# Patient Record
Sex: MALE | Race: BLACK OR AFRICAN AMERICAN | NOT HISPANIC OR LATINO | Employment: FULL TIME | URBAN - METROPOLITAN AREA
[De-identification: names, ages, dates, MRNs, and addresses within clinical notes are randomized per-mention and may not be internally consistent; named-entity substitution may affect disease eponyms.]

---

## 2021-01-27 ENCOUNTER — OFFICE VISIT (OUTPATIENT)
Dept: URGENT CARE | Facility: CLINIC | Age: 21
End: 2021-01-27
Payer: MEDICAID

## 2021-01-27 VITALS
OXYGEN SATURATION: 100 % | SYSTOLIC BLOOD PRESSURE: 129 MMHG | WEIGHT: 195 LBS | BODY MASS INDEX: 31.34 KG/M2 | HEIGHT: 66 IN | TEMPERATURE: 98 F | DIASTOLIC BLOOD PRESSURE: 67 MMHG | HEART RATE: 89 BPM

## 2021-01-27 DIAGNOSIS — K52.9 GASTROENTERITIS: Primary | ICD-10-CM

## 2021-01-27 PROCEDURE — 99203 PR OFFICE/OUTPT VISIT, NEW, LEVL III, 30-44 MIN: ICD-10-PCS | Mod: S$GLB,,, | Performed by: FAMILY MEDICINE

## 2021-01-27 PROCEDURE — 99203 OFFICE O/P NEW LOW 30 MIN: CPT | Mod: S$GLB,,, | Performed by: FAMILY MEDICINE

## 2021-01-27 RX ORDER — DIPHENOXYLATE HYDROCHLORIDE AND ATROPINE SULFATE 2.5; .025 MG/1; MG/1
1 TABLET ORAL 4 TIMES DAILY PRN
Qty: 20 TABLET | Refills: 0 | Status: SHIPPED | OUTPATIENT
Start: 2021-01-27

## 2021-01-27 RX ORDER — PROMETHAZINE HYDROCHLORIDE 25 MG/1
25 SUPPOSITORY RECTAL EVERY 6 HOURS PRN
Qty: 5 SUPPOSITORY | Refills: 0 | Status: SHIPPED | OUTPATIENT
Start: 2021-01-27

## 2021-01-27 RX ORDER — PROMETHAZINE HYDROCHLORIDE 25 MG/1
25 TABLET ORAL EVERY 6 HOURS PRN
Qty: 20 TABLET | Refills: 0 | Status: SHIPPED | OUTPATIENT
Start: 2021-01-27

## 2021-05-04 ENCOUNTER — PATIENT MESSAGE (OUTPATIENT)
Dept: RESEARCH | Facility: HOSPITAL | Age: 21
End: 2021-05-04

## 2023-10-06 ENCOUNTER — HOSPITAL ENCOUNTER (EMERGENCY)
Facility: HOSPITAL | Age: 23
Discharge: HOME | End: 2023-10-06
Attending: EMERGENCY MEDICINE
Payer: MEDICAID

## 2023-10-06 VITALS
RESPIRATION RATE: 20 BRPM | SYSTOLIC BLOOD PRESSURE: 125 MMHG | HEIGHT: 68 IN | WEIGHT: 196 LBS | OXYGEN SATURATION: 100 % | BODY MASS INDEX: 29.7 KG/M2 | TEMPERATURE: 98 F | HEART RATE: 84 BPM | DIASTOLIC BLOOD PRESSURE: 62 MMHG

## 2023-10-06 DIAGNOSIS — Z20.2 EXPOSURE TO CHLAMYDIA: ICD-10-CM

## 2023-10-06 DIAGNOSIS — Z20.2 EXPOSURE TO STD: Primary | ICD-10-CM

## 2023-10-06 LAB
AMPHET+METHAMPHET UR QL: NEGATIVE
BARBITURATES UR QL SCN>200 NG/ML: NEGATIVE
BENZODIAZ UR QL SCN>200 NG/ML: NEGATIVE
BILIRUB UR QL STRIP: NEGATIVE
BZE UR QL SCN: NEGATIVE
CANNABINOIDS UR QL SCN: NEGATIVE
CLARITY UR: CLEAR
COLOR UR: YELLOW
CREAT UR-MCNC: 138.2 MG/DL (ref 23–375)
GLUCOSE UR QL STRIP: NEGATIVE
HGB UR QL STRIP: NEGATIVE
KETONES UR QL STRIP: NEGATIVE
LEUKOCYTE ESTERASE UR QL STRIP: NEGATIVE
METHADONE UR QL SCN>300 NG/ML: NEGATIVE
NITRITE UR QL STRIP: NEGATIVE
OPIATES UR QL SCN: NEGATIVE
PCP UR QL SCN>25 NG/ML: NEGATIVE
PH UR STRIP: 7 [PH] (ref 5–8)
PROT UR QL STRIP: NEGATIVE
SP GR UR STRIP: 1.02 (ref 1–1.03)
TOXICOLOGY INFORMATION: NORMAL
URN SPEC COLLECT METH UR: NORMAL
UROBILINOGEN UR STRIP-ACNC: NEGATIVE EU/DL

## 2023-10-06 PROCEDURE — 87591 N.GONORRHOEAE DNA AMP PROB: CPT | Performed by: NURSE PRACTITIONER

## 2023-10-06 PROCEDURE — 80307 DRUG TEST PRSMV CHEM ANLYZR: CPT | Performed by: NURSE PRACTITIONER

## 2023-10-06 PROCEDURE — 96372 THER/PROPH/DIAG INJ SC/IM: CPT | Performed by: NURSE PRACTITIONER

## 2023-10-06 PROCEDURE — 81003 URINALYSIS AUTO W/O SCOPE: CPT | Mod: 59 | Performed by: NURSE PRACTITIONER

## 2023-10-06 PROCEDURE — 99284 EMERGENCY DEPT VISIT MOD MDM: CPT

## 2023-10-06 PROCEDURE — 63600175 PHARM REV CODE 636 W HCPCS: Performed by: NURSE PRACTITIONER

## 2023-10-06 RX ORDER — CEFTRIAXONE 500 MG/1
500 INJECTION, POWDER, FOR SOLUTION INTRAMUSCULAR; INTRAVENOUS
Status: COMPLETED | OUTPATIENT
Start: 2023-10-06 | End: 2023-10-06

## 2023-10-06 RX ORDER — DOXYCYCLINE 100 MG/1
100 CAPSULE ORAL EVERY 12 HOURS
Qty: 14 CAPSULE | Refills: 0 | Status: SHIPPED | OUTPATIENT
Start: 2023-10-06 | End: 2023-10-13

## 2023-10-06 RX ADMIN — CEFTRIAXONE SODIUM 500 MG: 500 INJECTION, POWDER, FOR SOLUTION INTRAMUSCULAR; INTRAVENOUS at 05:10

## 2023-10-06 NOTE — FIRST PROVIDER EVALUATION
"Medical screening examination initiated.  I have conducted a focused provider triage encounter, findings are as follows:    Brief history of present illness:  pt came for shot for chlamydia    Vitals:    10/06/23 1643   BP: (!) 119/52   BP Location: Right arm   Patient Position: Sitting   Pulse: 81   Resp: 18   Temp: 98.1 °F (36.7 °C)   TempSrc: Oral   SpO2: 99%   Weight: 88.9 kg (196 lb)   Height: 5' 8" (1.727 m)       Pertinent physical exam:  NAD    Brief workup plan:  labs    Preliminary workup initiated; this workup will be continued and followed by the physician or advanced practice provider that is assigned to the patient when roomed.  "

## 2023-10-06 NOTE — ED TRIAGE NOTES
Nakul Bueno, a 23 y.o. male presents to the ED w/ complaint of chlamydia. Pt requesting IM shot. Pt denies any symptoms, pain, or discharge. Pt is AAOX4, VSS, and NADN.

## 2023-10-06 NOTE — ED PROVIDER NOTES
"Encounter Date: 10/6/2023    SCRIBE #1 NOTE: I, Marylu Richardsonyd, am scribing for, and in the presence of,  Carmine Blair NP. I have scribed the following portions of the note - Other sections scribed: HPI, ROS.       History     Chief Complaint   Patient presents with    STD CHECK     Pt to ER with known chlamydia. Pt requesting IM shot. Pt reports " some burning". No pain or discharge at this time       23 year old male presenting to the Emergency room requesting treatment for Chlamydia. Patient reports some concerns and notes itching sensation to penis. Patient also requesting drug screen for job. No other exacerbating or alleviating factors. NKDA. Denies penile pain, penile discharge, dysuria, or other associated symptoms. No further complaints at present time.       The history is provided by the patient. No  was used.     Review of patient's allergies indicates:  No Known Allergies  No past medical history on file.  Past Surgical History:   Procedure Laterality Date    ANKLE SURGERY       No family history on file.  Social History     Tobacco Use    Smoking status: Never    Smokeless tobacco: Never     Review of Systems   Constitutional:  Negative for fever.   HENT:  Negative for congestion.    Eyes:  Negative for visual disturbance.   Respiratory:  Negative for shortness of breath.    Cardiovascular:  Negative for chest pain.   Gastrointestinal:  Negative for abdominal pain.   Genitourinary:  Negative for dysuria, penile discharge, penile pain and testicular pain.   Musculoskeletal:  Negative for arthralgias.   Skin:  Negative for rash.   Neurological:  Negative for headaches.       Physical Exam     Initial Vitals [10/06/23 1643]   BP Pulse Resp Temp SpO2   (!) 119/52 81 18 98.1 °F (36.7 °C) 99 %      MAP       --         Physical Exam    Nursing note and vitals reviewed.  Constitutional: He appears well-developed and well-nourished. He is not diaphoretic. No distress.   HENT:   Head: " Normocephalic and atraumatic.   Right Ear: External ear normal.   Left Ear: External ear normal.   Nose: Nose normal.   Eyes: EOM are normal. Right eye exhibits no discharge. Left eye exhibits no discharge.   Neck: Neck supple. No tracheal deviation present.   Normal range of motion.  Cardiovascular:  Normal rate.           Pulmonary/Chest: No stridor. No respiratory distress.   Abdominal: Abdomen is soft. He exhibits no distension. There is no abdominal tenderness.   Musculoskeletal:         General: No tenderness. Normal range of motion.      Cervical back: Normal range of motion and neck supple.     Neurological: He is alert and oriented to person, place, and time. He has normal strength. No cranial nerve deficit.   Skin: Skin is warm and dry.   Psychiatric: He has a normal mood and affect. His behavior is normal. Judgment and thought content normal.         ED Course   Procedures  Labs Reviewed   C. TRACHOMATIS/N. GONORRHOEAE BY AMP DNA   URINALYSIS, REFLEX TO URINE CULTURE   DRUG SCREEN PANEL, URINE EMERGENCY          Imaging Results    None          Medications   cefTRIAXone injection 500 mg (has no administration in time range)     Medical Decision Making  Significant other recently tested positive for chlamydia.  Gonorrhea and chlamydia testing in process.  Treated empirically with Rocephin in the ED. Discharged on doxycycline.  Follow-up with PCP as needed.  ED return precautions given.  Shared decision-making with the patient.    Amount and/or Complexity of Data Reviewed  Labs: ordered. Decision-making details documented in ED Course.    Risk  Prescription drug management.          ICarmine NP, personally performed the services described in this documentation.  All medical record entries made by the scribe were at my direction and in my presence.  I have reviewed the chart and agree that the record reflects my personal performance and is accurate and complete.      Scribe Attestation:   David  #1: I performed the above scribed service and the documentation accurately describes the services I performed. I attest to the accuracy of the note.                        Clinical Impression:   Final diagnoses:  [Z20.2] Exposure to STD (Primary)  [Z20.2] Exposure to chlamydia        ED Disposition Condition    Discharge Stable          ED Prescriptions       Medication Sig Dispense Start Date End Date Auth. Provider    doxycycline (MONODOX) 100 MG capsule Take 1 capsule (100 mg total) by mouth every 12 (twelve) hours. for 7 days 14 capsule 10/6/2023 10/13/2023 Carmine Blair, AALIYAH          Follow-up Information       Follow up With Specialties Details Why Contact Info    [facility] -  Schedule an appointment as soon as possible for a visit  For further evaluation [address] [place] [address]  [phone]      [facility] - Emergency Dept Emergency Medicine Go to  If symptoms worsen, As needed [address]  [facility] [id number] [phone]             Carmine Blair, NP  10/06/23 7013

## 2023-10-06 NOTE — DISCHARGE INSTRUCTIONS
Take antibiotics twice daily as prescribed until they are gone.  You should not have any left over.  No sexual activity until antibiotics are complete.    Thank you for coming to our Emergency Department today. It is important to remember that some problems are difficult to diagnose and may not be found during your first visit. Be sure to follow up with your primary care doctor.  If you do not have one, you may contact the one listed on your discharge paperwork or you may also call the [facility] Appointment Desk at [phone] to schedule an appointment with one.     Return to the ER with any questions/concerns, new/concerning symptoms, worsening or failure to improve. Do not drive or make any important decisions for 24 hours if you have received any pain medications, sedatives or mood altering drugs during your ER visit.

## 2023-10-09 LAB
C TRACH DNA SPEC QL NAA+PROBE: DETECTED
N GONORRHOEA DNA SPEC QL NAA+PROBE: NOT DETECTED

## 2024-05-02 ENCOUNTER — HOSPITAL ENCOUNTER (OUTPATIENT)
Facility: HOSPITAL | Age: 24
Discharge: HOME | End: 2024-05-02
Attending: EMERGENCY MEDICINE | Admitting: EMERGENCY MEDICINE
Payer: MEDICAID

## 2024-05-02 VITALS
TEMPERATURE: 98 F | HEART RATE: 68 BPM | DIASTOLIC BLOOD PRESSURE: 84 MMHG | SYSTOLIC BLOOD PRESSURE: 125 MMHG | BODY MASS INDEX: 29.65 KG/M2 | RESPIRATION RATE: 18 BRPM | WEIGHT: 195 LBS | OXYGEN SATURATION: 97 %

## 2024-05-02 DIAGNOSIS — R07.9 CHEST PAIN: ICD-10-CM

## 2024-05-02 DIAGNOSIS — R11.2 NAUSEA AND VOMITING, UNSPECIFIED VOMITING TYPE: ICD-10-CM

## 2024-05-02 DIAGNOSIS — E87.20 ACIDOSIS: ICD-10-CM

## 2024-05-02 DIAGNOSIS — R74.8 ELEVATED CPK: Primary | ICD-10-CM

## 2024-05-02 DIAGNOSIS — E87.20 METABOLIC ACIDOSIS: ICD-10-CM

## 2024-05-02 PROBLEM — E87.29 HIGH ANION GAP METABOLIC ACIDOSIS: Status: ACTIVE | Noted: 2024-05-02

## 2024-05-02 LAB
ALBUMIN SERPL BCP-MCNC: 4.3 G/DL (ref 3.5–5.2)
ALBUMIN SERPL BCP-MCNC: 5.4 G/DL (ref 3.5–5.2)
ALLENS TEST: ABNORMAL
ALP SERPL-CCNC: 55 U/L (ref 55–135)
ALP SERPL-CCNC: 69 U/L (ref 55–135)
ALT SERPL W/O P-5'-P-CCNC: 12 U/L (ref 10–44)
ALT SERPL W/O P-5'-P-CCNC: 15 U/L (ref 10–44)
AMPHET+METHAMPHET UR QL: NEGATIVE
ANION GAP SERPL CALC-SCNC: 13 MMOL/L (ref 8–16)
ANION GAP SERPL CALC-SCNC: 17 MMOL/L (ref 8–16)
AST SERPL-CCNC: 24 U/L (ref 10–40)
AST SERPL-CCNC: 32 U/L (ref 10–40)
B-OH-BUTYR BLD STRIP-SCNC: 1.3 MMOL/L (ref 0–0.5)
BACTERIA #/AREA URNS HPF: ABNORMAL /HPF
BARBITURATES UR QL SCN>200 NG/ML: NEGATIVE
BASOPHILS # BLD AUTO: 0.02 K/UL (ref 0–0.2)
BASOPHILS NFR BLD: 0.4 % (ref 0–1.9)
BENZODIAZ UR QL SCN>200 NG/ML: NEGATIVE
BILIRUB SERPL-MCNC: 1.1 MG/DL (ref 0.1–1)
BILIRUB SERPL-MCNC: 1.4 MG/DL (ref 0.1–1)
BILIRUB UR QL STRIP: NEGATIVE
BUN SERPL-MCNC: 13 MG/DL (ref 6–20)
BUN SERPL-MCNC: 17 MG/DL (ref 6–20)
BZE UR QL SCN: NEGATIVE
CALCIUM SERPL-MCNC: 10.7 MG/DL (ref 8.7–10.5)
CALCIUM SERPL-MCNC: 9.2 MG/DL (ref 8.7–10.5)
CANNABINOIDS UR QL SCN: NEGATIVE
CHLORIDE SERPL-SCNC: 101 MMOL/L (ref 95–110)
CHLORIDE SERPL-SCNC: 109 MMOL/L (ref 95–110)
CK SERPL-CCNC: 689 U/L (ref 20–200)
CK SERPL-CCNC: 928 U/L (ref 20–200)
CLARITY UR: CLEAR
CO2 SERPL-SCNC: 19 MMOL/L (ref 23–29)
CO2 SERPL-SCNC: 21 MMOL/L (ref 23–29)
COLOR UR: YELLOW
CREAT SERPL-MCNC: 0.9 MG/DL (ref 0.5–1.4)
CREAT SERPL-MCNC: 1.2 MG/DL (ref 0.5–1.4)
CREAT UR-MCNC: 405.2 MG/DL (ref 23–375)
DIFFERENTIAL METHOD BLD: ABNORMAL
EOSINOPHIL # BLD AUTO: 0 K/UL (ref 0–0.5)
EOSINOPHIL NFR BLD: 0.6 % (ref 0–8)
ERYTHROCYTE [DISTWIDTH] IN BLOOD BY AUTOMATED COUNT: 11.6 % (ref 11.5–14.5)
EST. GFR  (NO RACE VARIABLE): >60 ML/MIN/1.73 M^2
EST. GFR  (NO RACE VARIABLE): >60 ML/MIN/1.73 M^2
ETHANOL SERPL-MCNC: <10 MG/DL
GLUCOSE SERPL-MCNC: 87 MG/DL (ref 70–110)
GLUCOSE SERPL-MCNC: 90 MG/DL (ref 70–110)
GLUCOSE UR QL STRIP: NEGATIVE
HCO3 UR-SCNC: 24 MMOL/L (ref 24–28)
HCT VFR BLD AUTO: 47.3 % (ref 40–54)
HGB BLD-MCNC: 16.1 G/DL (ref 14–18)
HGB UR QL STRIP: ABNORMAL
HYALINE CASTS #/AREA URNS LPF: 11 /LPF
IMM GRANULOCYTES # BLD AUTO: 0.01 K/UL (ref 0–0.04)
IMM GRANULOCYTES NFR BLD AUTO: 0.2 % (ref 0–0.5)
KETONES UR QL STRIP: ABNORMAL
LACTATE SERPL-SCNC: 0.6 MMOL/L (ref 0.5–2.2)
LEUKOCYTE ESTERASE UR QL STRIP: NEGATIVE
LIPASE SERPL-CCNC: 24 U/L (ref 4–60)
LYMPHOCYTES # BLD AUTO: 1.5 K/UL (ref 1–4.8)
LYMPHOCYTES NFR BLD: 29.1 % (ref 18–48)
MAGNESIUM SERPL-MCNC: 1.9 MG/DL (ref 1.6–2.6)
MCH RBC QN AUTO: 29 PG (ref 27–31)
MCHC RBC AUTO-ENTMCNC: 34 G/DL (ref 32–36)
MCV RBC AUTO: 85 FL (ref 82–98)
METHADONE UR QL SCN>300 NG/ML: NEGATIVE
MICROSCOPIC COMMENT: ABNORMAL
MONOCYTES # BLD AUTO: 0.4 K/UL (ref 0.3–1)
MONOCYTES NFR BLD: 7.4 % (ref 4–15)
NEUTROPHILS # BLD AUTO: 3.1 K/UL (ref 1.8–7.7)
NEUTROPHILS NFR BLD: 62.3 % (ref 38–73)
NITRITE UR QL STRIP: NEGATIVE
NRBC BLD-RTO: 0 /100 WBC
OPIATES UR QL SCN: NEGATIVE
PCO2 BLDA: 48.9 MMHG (ref 35–45)
PCP UR QL SCN>25 NG/ML: NEGATIVE
PH SMN: 7.3 [PH] (ref 7.35–7.45)
PH UR STRIP: 6 [PH] (ref 5–8)
PHOSPHATE SERPL-MCNC: 2.7 MG/DL (ref 2.7–4.5)
PLATELET # BLD AUTO: 220 K/UL (ref 150–450)
PMV BLD AUTO: 9 FL (ref 9.2–12.9)
PO2 BLDA: 21 MMHG (ref 40–60)
POC BE: -3 MMOL/L
POC SATURATED O2: 30 % (ref 95–100)
POC TCO2: 25 MMOL/L (ref 24–29)
POTASSIUM SERPL-SCNC: 3.7 MMOL/L (ref 3.5–5.1)
POTASSIUM SERPL-SCNC: 3.8 MMOL/L (ref 3.5–5.1)
PROT SERPL-MCNC: 6.8 G/DL (ref 6–8.4)
PROT SERPL-MCNC: 8.8 G/DL (ref 6–8.4)
PROT UR QL STRIP: ABNORMAL
RBC # BLD AUTO: 5.56 M/UL (ref 4.6–6.2)
RBC #/AREA URNS HPF: 31 /HPF (ref 0–4)
SAMPLE: ABNORMAL
SITE: ABNORMAL
SODIUM SERPL-SCNC: 139 MMOL/L (ref 136–145)
SODIUM SERPL-SCNC: 141 MMOL/L (ref 136–145)
SP GR UR STRIP: >1.03 (ref 1–1.03)
TOXICOLOGY INFORMATION: ABNORMAL
URN SPEC COLLECT METH UR: ABNORMAL
UROBILINOGEN UR STRIP-ACNC: ABNORMAL EU/DL
WBC # BLD AUTO: 5.01 K/UL (ref 3.9–12.7)
WBC #/AREA URNS HPF: 4 /HPF (ref 0–5)

## 2024-05-02 PROCEDURE — 99900035 HC TECH TIME PER 15 MIN (STAT)

## 2024-05-02 PROCEDURE — 80053 COMPREHEN METABOLIC PANEL: CPT | Performed by: NURSE PRACTITIONER

## 2024-05-02 PROCEDURE — 82803 BLOOD GASES ANY COMBINATION: CPT

## 2024-05-02 PROCEDURE — 80053 COMPREHEN METABOLIC PANEL: CPT | Mod: 91 | Performed by: PHYSICIAN ASSISTANT

## 2024-05-02 PROCEDURE — 82010 KETONE BODYS QUAN: CPT | Performed by: NURSE PRACTITIONER

## 2024-05-02 PROCEDURE — 63600175 PHARM REV CODE 636 W HCPCS: Performed by: PHYSICIAN ASSISTANT

## 2024-05-02 PROCEDURE — 84100 ASSAY OF PHOSPHORUS: CPT | Performed by: NURSE PRACTITIONER

## 2024-05-02 PROCEDURE — G0378 HOSPITAL OBSERVATION PER HR: HCPCS

## 2024-05-02 PROCEDURE — 83690 ASSAY OF LIPASE: CPT | Performed by: NURSE PRACTITIONER

## 2024-05-02 PROCEDURE — 83735 ASSAY OF MAGNESIUM: CPT | Performed by: NURSE PRACTITIONER

## 2024-05-02 PROCEDURE — 83605 ASSAY OF LACTIC ACID: CPT | Performed by: NURSE PRACTITIONER

## 2024-05-02 PROCEDURE — 96374 THER/PROPH/DIAG INJ IV PUSH: CPT

## 2024-05-02 PROCEDURE — 99285 EMERGENCY DEPT VISIT HI MDM: CPT | Mod: 25

## 2024-05-02 PROCEDURE — 85025 COMPLETE CBC W/AUTO DIFF WBC: CPT | Performed by: NURSE PRACTITIONER

## 2024-05-02 PROCEDURE — 81000 URINALYSIS NONAUTO W/SCOPE: CPT | Performed by: NURSE PRACTITIONER

## 2024-05-02 PROCEDURE — 25000003 PHARM REV CODE 250: Performed by: NURSE PRACTITIONER

## 2024-05-02 PROCEDURE — 82077 ASSAY SPEC XCP UR&BREATH IA: CPT | Performed by: NURSE PRACTITIONER

## 2024-05-02 PROCEDURE — 63600175 PHARM REV CODE 636 W HCPCS: Performed by: NURSE PRACTITIONER

## 2024-05-02 PROCEDURE — 80307 DRUG TEST PRSMV CHEM ANLYZR: CPT | Performed by: NURSE PRACTITIONER

## 2024-05-02 PROCEDURE — 82550 ASSAY OF CK (CPK): CPT | Mod: 91 | Performed by: PHYSICIAN ASSISTANT

## 2024-05-02 PROCEDURE — 82550 ASSAY OF CK (CPK): CPT | Performed by: NURSE PRACTITIONER

## 2024-05-02 PROCEDURE — 96361 HYDRATE IV INFUSION ADD-ON: CPT

## 2024-05-02 RX ORDER — NALOXONE HCL 0.4 MG/ML
0.02 VIAL (ML) INJECTION
Status: DISCONTINUED | OUTPATIENT
Start: 2024-05-02 | End: 2024-05-02 | Stop reason: HOSPADM

## 2024-05-02 RX ORDER — GLUCAGON 1 MG
1 KIT INJECTION
Status: DISCONTINUED | OUTPATIENT
Start: 2024-05-02 | End: 2024-05-02 | Stop reason: HOSPADM

## 2024-05-02 RX ORDER — TALC
6 POWDER (GRAM) TOPICAL NIGHTLY PRN
Status: DISCONTINUED | OUTPATIENT
Start: 2024-05-02 | End: 2024-05-02 | Stop reason: HOSPADM

## 2024-05-02 RX ORDER — IBUPROFEN 200 MG
16 TABLET ORAL
Status: DISCONTINUED | OUTPATIENT
Start: 2024-05-02 | End: 2024-05-02 | Stop reason: HOSPADM

## 2024-05-02 RX ORDER — LANOLIN ALCOHOL/MO/W.PET/CERES
800 CREAM (GRAM) TOPICAL
Status: DISCONTINUED | OUTPATIENT
Start: 2024-05-02 | End: 2024-05-02 | Stop reason: HOSPADM

## 2024-05-02 RX ORDER — SODIUM CHLORIDE 0.9 % (FLUSH) 0.9 %
10 SYRINGE (ML) INJECTION
Status: DISCONTINUED | OUTPATIENT
Start: 2024-05-02 | End: 2024-05-02 | Stop reason: HOSPADM

## 2024-05-02 RX ORDER — SODIUM CHLORIDE, SODIUM LACTATE, POTASSIUM CHLORIDE, CALCIUM CHLORIDE 600; 310; 30; 20 MG/100ML; MG/100ML; MG/100ML; MG/100ML
INJECTION, SOLUTION INTRAVENOUS CONTINUOUS
Status: DISCONTINUED | OUTPATIENT
Start: 2024-05-02 | End: 2024-05-02 | Stop reason: HOSPADM

## 2024-05-02 RX ORDER — AMOXICILLIN 250 MG
1 CAPSULE ORAL DAILY PRN
Status: DISCONTINUED | OUTPATIENT
Start: 2024-05-02 | End: 2024-05-02 | Stop reason: HOSPADM

## 2024-05-02 RX ORDER — ONDANSETRON HYDROCHLORIDE 2 MG/ML
8 INJECTION, SOLUTION INTRAVENOUS
Status: COMPLETED | OUTPATIENT
Start: 2024-05-02 | End: 2024-05-02

## 2024-05-02 RX ORDER — SODIUM CHLORIDE 0.9 % (FLUSH) 0.9 %
10 SYRINGE (ML) INJECTION EVERY 8 HOURS
Status: DISCONTINUED | OUTPATIENT
Start: 2024-05-02 | End: 2024-05-02

## 2024-05-02 RX ORDER — IBUPROFEN 200 MG
24 TABLET ORAL
Status: DISCONTINUED | OUTPATIENT
Start: 2024-05-02 | End: 2024-05-02 | Stop reason: HOSPADM

## 2024-05-02 RX ORDER — ACETAMINOPHEN 325 MG/1
650 TABLET ORAL EVERY 4 HOURS PRN
Status: DISCONTINUED | OUTPATIENT
Start: 2024-05-02 | End: 2024-05-02 | Stop reason: HOSPADM

## 2024-05-02 RX ADMIN — ONDANSETRON 8 MG: 2 INJECTION INTRAMUSCULAR; INTRAVENOUS at 12:05

## 2024-05-02 RX ADMIN — SODIUM CHLORIDE 1000 ML: 9 INJECTION, SOLUTION INTRAVENOUS at 12:05

## 2024-05-02 RX ADMIN — SODIUM CHLORIDE, POTASSIUM CHLORIDE, SODIUM LACTATE AND CALCIUM CHLORIDE: 600; 310; 30; 20 INJECTION, SOLUTION INTRAVENOUS at 03:05

## 2024-05-02 RX ADMIN — SODIUM CHLORIDE 1000 ML: 9 INJECTION, SOLUTION INTRAVENOUS at 02:05

## 2024-05-02 NOTE — SUBJECTIVE & OBJECTIVE
No past medical history on file.    Past Surgical History:   Procedure Laterality Date    ANKLE SURGERY         Review of patient's allergies indicates:  No Known Allergies    Current Facility-Administered Medications   Medication Dose Route Frequency Provider Last Rate Last Admin    acetaminophen tablet 650 mg  650 mg Oral Q4H PRN ShowEstuardo devlin PA-C        glucagon (human recombinant) injection 1 mg  1 mg Intramuscular PRN ShowEstuardo devlin PA-C        glucose chewable tablet 16 g  16 g Oral PRN ShowEstuardo devlin PA-C        glucose chewable tablet 24 g  24 g Oral PRN ShowEstuardo devlin PA-C        lactated ringers infusion   Intravenous Continuous ShowEstuardo devlin PA-C        magnesium oxide tablet 800 mg  800 mg Oral PRN ShowEstuardo devlin PA-C        magnesium oxide tablet 800 mg  800 mg Oral PRN ShowEstuardo devlin PA-C        melatonin tablet 6 mg  6 mg Oral Nightly PRN ShowEstuardo devlin PA-C        naloxone 0.4 mg/mL injection 0.02 mg  0.02 mg Intravenous PRN ShowEstuardo devlin PA-C        senna-docusate 8.6-50 mg per tablet 1 tablet  1 tablet Oral Daily PRN ShowEstuardo devlin PA-C        sodium chloride 0.9% bolus 1,000 mL 1,000 mL  1,000 mL Intravenous ED 1 Time BlairCarmine,  mL/hr at 05/02/24 1427 1,000 mL at 05/02/24 1427    sodium chloride 0.9% flush 10 mL  10 mL Intravenous PRN ShowEstuardo devlin PA-C         Current Outpatient Medications   Medication Sig Dispense Refill    diphenoxylate-atropine 2.5-0.025 mg (LOMOTIL) 2.5-0.025 mg per tablet Take 1 tablet by mouth 4 (four) times daily as needed for Diarrhea. 20 tablet 0    hyoscyamine (ANASPAZ,LEVSIN) 0.125 mg Tab Take 1 tablet (125 mcg total) by mouth every 4 (four) hours as needed. 12 tablet 0    ondansetron (ZOFRAN) 4 MG tablet Take 1 tablet (4 mg total) by mouth every 6 (six) hours. (Patient not taking: Reported on 1/27/2021) 12 tablet 0    promethazine (PHENERGAN) 25 MG suppository Place 1 suppository (25 mg total) rectally every 6 (six) hours as  needed for Nausea. 5 suppository 0    promethazine (PHENERGAN) 25 MG tablet Take 1 tablet (25 mg total) by mouth every 6 (six) hours as needed for Nausea. 20 tablet 0     Family History    None       Tobacco Use    Smoking status: Never    Smokeless tobacco: Never   Substance and Sexual Activity    Alcohol use: Not Currently    Drug use: Never    Sexual activity: Not on file     Review of Systems   Constitutional:  Negative for chills and fever.   HENT:  Negative for nosebleeds and tinnitus.    Eyes:  Negative for photophobia and visual disturbance.   Respiratory:  Negative for shortness of breath and wheezing.    Cardiovascular:  Negative for chest pain, palpitations and leg swelling.   Gastrointestinal:  Negative for abdominal distention, nausea and vomiting.   Genitourinary:  Negative for dysuria, flank pain and hematuria.   Musculoskeletal:  Positive for myalgias. Negative for gait problem and joint swelling.   Skin:  Negative for rash and wound.   Neurological:  Negative for seizures and syncope.     Objective:     Vital Signs (Most Recent):  Temp: 98.4 °F (36.9 °C) (05/02/24 1141)  Pulse: 75 (05/02/24 1141)  Resp: 18 (05/02/24 1141)  BP: (!) 141/85 (05/02/24 1141)  SpO2: 98 % (05/02/24 1141) Vital Signs (24h Range):  Temp:  [98.4 °F (36.9 °C)] 98.4 °F (36.9 °C)  Pulse:  [75] 75  Resp:  [18] 18  SpO2:  [98 %] 98 %  BP: (141)/(85) 141/85     Weight: 88.5 kg (195 lb)  Body mass index is 29.65 kg/m².     Physical Exam  Vitals and nursing note reviewed.   Constitutional:       General: He is not in acute distress.     Appearance: He is well-developed.   HENT:      Head: Normocephalic and atraumatic.      Right Ear: External ear normal.      Left Ear: External ear normal.   Eyes:      General:         Right eye: No discharge.         Left eye: No discharge.      Conjunctiva/sclera: Conjunctivae normal.   Neck:      Thyroid: No thyromegaly.   Cardiovascular:      Rate and Rhythm: Normal rate and regular rhythm.       Heart sounds: No murmur heard.  Pulmonary:      Effort: Pulmonary effort is normal. No respiratory distress.      Breath sounds: Normal breath sounds.   Abdominal:      General: Bowel sounds are normal. There is no distension.      Palpations: Abdomen is soft. There is no mass.      Tenderness: There is no abdominal tenderness.   Musculoskeletal:         General: No deformity.      Cervical back: Normal range of motion and neck supple.      Right lower leg: No edema.      Left lower leg: No edema.   Skin:     General: Skin is warm and dry.   Neurological:      Mental Status: He is alert and oriented to person, place, and time.      Sensory: No sensory deficit.   Psychiatric:         Mood and Affect: Mood normal.         Behavior: Behavior normal.                Significant Labs: CBC:   Recent Labs   Lab 05/02/24  1229   WBC 5.01   HGB 16.1   HCT 47.3        CMP:   Recent Labs   Lab 05/02/24  1229      K 3.7      CO2 21*   GLU 90   BUN 17   CREATININE 1.2   CALCIUM 10.7*   PROT 8.8*   ALBUMIN 5.4*   BILITOT 1.4*   ALKPHOS 69   AST 32   ALT 15   ANIONGAP 17*     Urine Studies:   Recent Labs   Lab 05/02/24  1320   COLORU Yellow   APPEARANCEUA Clear   PHUR 6.0   SPECGRAV >1.030*   PROTEINUA 1+*   GLUCUA Negative   KETONESU Trace*   BILIRUBINUA Negative   OCCULTUA 1+*   NITRITE Negative   UROBILINOGEN 2.0-3.0*   LEUKOCYTESUR Negative   RBCUA 31*   WBCUA 4   BACTERIA None   HYALINECASTS 11*       Significant Imaging:   Imaging Results              X-Ray Chest PA And Lateral (In process)

## 2024-05-02 NOTE — H&P
[facility] Medicine  History & Physical    Patient Name: Nakul Bueno  MRN: [id number]  Patient Class: OP- Observation  Admission Date: 5/2/2024  Attending Physician: Tatyana Mendoza MD   Primary Care Provider: Sharri Primary Doctor         Patient information was obtained from patient, past medical records, and ER records.     Subjective:     Principal Problem:High anion gap metabolic acidosis    Chief Complaint:   Chief Complaint   Patient presents with    Vomiting     Pt c/o chills since Saturday and vomiting today. MM pink and moist. Afebrile here in ED         HPI: Nakul Bueno 24 y.o. male presents to the hospital with a chief complaint of chills and cramps.  Reports 4-5 days of intermittent cramping with associated chills.  Yesterday he worked for a.m. to 4:00 p.m. outside in the heat and awoke today finding diffuse cramps causing presentation to the hospital.  He had 3 episodes of vomiting today.  He attempted treatment at home with Gatorade fluids and a banana without improvement.  He reports improvement with treatment in the emergency room.  He takes no daily medications.  He reports he occasionally vapes.  He denies fever chest pain shortness breast leg swelling melena hematuria hematemesis dizziness and syncope.    In the ED, afebrile without leukocytosis anion gap 17 CO2 21  beta hydroxy 1.3 pH 7.299 on VBG.    No past medical history on file.    Past Surgical History:   Procedure Laterality Date    ANKLE SURGERY         Review of patient's allergies indicates:  No Known Allergies    Current Facility-Administered Medications   Medication Dose Route Frequency Provider Last Rate Last Admin    acetaminophen tablet 650 mg  650 mg Oral Q4H PRN Estuardo Burton PA-C        glucagon (human recombinant) injection 1 mg  1 mg Intramuscular PRN ShowEstuardo devlin PA-C        glucose chewable tablet 16 g  16 g Oral PRN ShowEstuardo devlin PA-C        glucose chewable tablet 24 g  24 g  Oral PRN ShowEstuardo devlin PA-C        lactated ringers infusion   Intravenous Continuous ShowEstuardo devlin PA-C        magnesium oxide tablet 800 mg  800 mg Oral PRN ShowEstuardo devlin PA-C        magnesium oxide tablet 800 mg  800 mg Oral PRN ShowEstuardo devlin PA-C        melatonin tablet 6 mg  6 mg Oral Nightly PRN ShowEstuardo devlin PA-C        naloxone 0.4 mg/mL injection 0.02 mg  0.02 mg Intravenous PRN ShowEstuardo devlin PA-C        senna-docusate 8.6-50 mg per tablet 1 tablet  1 tablet Oral Daily PRN ShowEstuardo devlin PA-C        sodium chloride 0.9% bolus 1,000 mL 1,000 mL  1,000 mL Intravenous ED 1 Time Carmine Blair,  mL/hr at 05/02/24 1427 1,000 mL at 05/02/24 1427    sodium chloride 0.9% flush 10 mL  10 mL Intravenous PRN ShowEstuardo devlin PA-C         Current Outpatient Medications   Medication Sig Dispense Refill    diphenoxylate-atropine 2.5-0.025 mg (LOMOTIL) 2.5-0.025 mg per tablet Take 1 tablet by mouth 4 (four) times daily as needed for Diarrhea. 20 tablet 0    hyoscyamine (ANASPAZ,LEVSIN) 0.125 mg Tab Take 1 tablet (125 mcg total) by mouth every 4 (four) hours as needed. 12 tablet 0    ondansetron (ZOFRAN) 4 MG tablet Take 1 tablet (4 mg total) by mouth every 6 (six) hours. (Patient not taking: Reported on 1/27/2021) 12 tablet 0    promethazine (PHENERGAN) 25 MG suppository Place 1 suppository (25 mg total) rectally every 6 (six) hours as needed for Nausea. 5 suppository 0    promethazine (PHENERGAN) 25 MG tablet Take 1 tablet (25 mg total) by mouth every 6 (six) hours as needed for Nausea. 20 tablet 0     Family History    None       Tobacco Use    Smoking status: Never    Smokeless tobacco: Never   Substance and Sexual Activity    Alcohol use: Not Currently    Drug use: Never    Sexual activity: Not on file     Review of Systems   Constitutional:  Negative for chills and fever.   HENT:  Negative for nosebleeds and tinnitus.    Eyes:  Negative for photophobia and visual disturbance.   Respiratory:   Negative for shortness of breath and wheezing.    Cardiovascular:  Negative for chest pain, palpitations and leg swelling.   Gastrointestinal:  Negative for abdominal distention, nausea and vomiting.   Genitourinary:  Negative for dysuria, flank pain and hematuria.   Musculoskeletal:  Positive for myalgias. Negative for gait problem and joint swelling.   Skin:  Negative for rash and wound.   Neurological:  Negative for seizures and syncope.     Objective:     Vital Signs (Most Recent):  Temp: 98.4 °F (36.9 °C) (05/02/24 1141)  Pulse: 75 (05/02/24 1141)  Resp: 18 (05/02/24 1141)  BP: (!) 141/85 (05/02/24 1141)  SpO2: 98 % (05/02/24 1141) Vital Signs (24h Range):  Temp:  [98.4 °F (36.9 °C)] 98.4 °F (36.9 °C)  Pulse:  [75] 75  Resp:  [18] 18  SpO2:  [98 %] 98 %  BP: (141)/(85) 141/85     Weight: 88.5 kg (195 lb)  Body mass index is 29.65 kg/m².     Physical Exam  Vitals and nursing note reviewed.   Constitutional:       General: He is not in acute distress.     Appearance: He is well-developed.   HENT:      Head: Normocephalic and atraumatic.      Right Ear: External ear normal.      Left Ear: External ear normal.   Eyes:      General:         Right eye: No discharge.         Left eye: No discharge.      Conjunctiva/sclera: Conjunctivae normal.   Neck:      Thyroid: No thyromegaly.   Cardiovascular:      Rate and Rhythm: Normal rate and regular rhythm.      Heart sounds: No murmur heard.  Pulmonary:      Effort: Pulmonary effort is normal. No respiratory distress.      Breath sounds: Normal breath sounds.   Abdominal:      General: Bowel sounds are normal. There is no distension.      Palpations: Abdomen is soft. There is no mass.      Tenderness: There is no abdominal tenderness.   Musculoskeletal:         General: No deformity.      Cervical back: Normal range of motion and neck supple.      Right lower leg: No edema.      Left lower leg: No edema.   Skin:     General: Skin is warm and dry.   Neurological:       Mental Status: He is alert and oriented to person, place, and time.      Sensory: No sensory deficit.   Psychiatric:         Mood and Affect: Mood normal.         Behavior: Behavior normal.                Significant Labs: CBC:   Recent Labs   Lab 05/02/24  1229   WBC 5.01   HGB 16.1   HCT 47.3        CMP:   Recent Labs   Lab 05/02/24  1229      K 3.7      CO2 21*   GLU 90   BUN 17   CREATININE 1.2   CALCIUM 10.7*   PROT 8.8*   ALBUMIN 5.4*   BILITOT 1.4*   ALKPHOS 69   AST 32   ALT 15   ANIONGAP 17*     Urine Studies:   Recent Labs   Lab 05/02/24  1320   COLORU Yellow   APPEARANCEUA Clear   PHUR 6.0   SPECGRAV >1.030*   PROTEINUA 1+*   GLUCUA Negative   KETONESU Trace*   BILIRUBINUA Negative   OCCULTUA 1+*   NITRITE Negative   UROBILINOGEN 2.0-3.0*   LEUKOCYTESUR Negative   RBCUA 31*   WBCUA 4   BACTERIA None   HYALINECASTS 11*       Significant Imaging:   Imaging Results              X-Ray Chest PA And Lateral (In process)                     Assessment/Plan:     * High anion gap metabolic acidosis  Presents with cramps in setting of exertional work. CPK of 928 with CO2 of 21 and anion gap of 17. Beta 1.3. VBG with pH of 7.299 though normal bicarb  Will start on IVF  Repeat CPK and CMP  Check chest x-ray given VBG findings, though patient without respiratory complaints      VTE Risk Mitigation (From admission, onward)           Ordered     IP VTE LOW RISK PATIENT  Once         05/02/24 1432     Place sequential compression device  Until discontinued         05/02/24 1432                     Discussed with ED physician.    VTE: dennys/scd  Code: full  Diet: regular  Dispo: pending IVF and repeat CMP      On 05/02/2024, patient should be placed in hospital observation services under my care in collaboration with Tatyana Mendoza MD.      AdmissionCare    Guideline: General Observation, Observation    Based on the indications selected for the patient, the bed status of Observation was determined to  be MET    The following indications were selected as present at the time of evaluation of the patient:      - Clinical care (eg, testing, monitoring, or treatment) needed beyond the usual emergency department time frame (eg, 3 to 4 hours)   - Clinical care needed is not appropriate for a lower level of care (ie, discharge to outpatient setting not appropriate).   - Patient has clinical condition for which observation care is needed, as indicated by 1 or more of the following:    - Electrolyte or metabolic condition or finding (eg, hypernatremia, hyponatremia, hyperkalemia, hypokalemia, hypercalcemia, metabolic acidosis, malnutrition, Anasarca)    - Musculoskeletal condition or finding (eg, injury, dislocation, fracture, suspected joint or bone infection, trauma, exacerbation of rheumatologic disease, suspected rhabdomyolysis, suspected compartment syndrome, frostbite)    AdmissionCare documentation entered by: Dejah Khan    [facility], 27th edition, Copyright © 2023 [facility] [employer], [facility] All Rights Reserved.  [id number]:25:19-05:00     Estuardo Burton PA-C  Department of Hospital Medicine  [facility] - Emergency Dept

## 2024-05-02 NOTE — NURSING
Pt has no complaints of nausea/ vomiting. Vitals stable, nad, pt has partner at bedside along with  baby. Fluids going @ 100ml/hr. Will continue to monitor.

## 2024-05-02 NOTE — ED PROVIDER NOTES
Encounter Date: 5/2/2024    SCRIBE #1 NOTE: I, Stephie Stanford, am scribing for, and in the presence of,  Carmine Blair NP. I have scribed the following portions of the note - Other sections scribed: HPI, ROS.       History     Chief Complaint   Patient presents with    Vomiting     Pt c/o chills since Saturday and vomiting today. MM pink and moist. Afebrile here in ED      Nakul Bueno is a 24 y.o. male, with no known past medical history, who presents to the ED with chills that began 5 days ago. 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 Patient reports associated subjective fever, muscle spasms and vomiting that began today. Patient reports attempting to drink lots of water, but voided the water shortly after. Patient denies cough, congestion, or other associated symptoms.       The history is provided by the patient. No  was used.     Review of patient's allergies indicates:  No Known Allergies  No past medical history on file.  Past Surgical History:   Procedure Laterality Date    ANKLE SURGERY       No family history on file.  Social History     Tobacco Use    Smoking status: Never    Smokeless tobacco: Never   Substance Use Topics    Alcohol use: Not Currently    Drug use: Never     Review of Systems   Constitutional:  Positive for chills and fever (subjective).   HENT:  Negative for congestion and sore throat.    Respiratory:  Negative for cough and shortness of breath.    Cardiovascular:  Negative for chest pain and leg swelling.   Gastrointestinal:  Positive for vomiting. Negative for abdominal pain, diarrhea and nausea.   Genitourinary:  Negative for dysuria and hematuria.   Musculoskeletal:  Negative for back pain and neck pain.        (+) muscle spasms   Skin:  Negative for rash.   Neurological:  Negative for syncope.       Physical Exam     Initial Vitals [05/02/24 1141]   BP Pulse Resp Temp SpO2   (!) 141/85 75 18 98.4 °F (36.9 °C) 98 %       MAP       --         Physical Exam    Nursing note and vitals reviewed.  Constitutional: He appears well-developed and well-nourished. He is not diaphoretic. No distress.   HENT:   Head: Normocephalic and atraumatic.   Right Ear: External ear normal.   Left Ear: External ear normal.   Nose: Nose normal.   Eyes: EOM are normal. Right eye exhibits no discharge. Left eye exhibits no discharge.   Neck: Neck supple. No tracheal deviation present.   Normal range of motion.  Cardiovascular:  Normal rate.           Pulmonary/Chest: No stridor. No respiratory distress.   Abdominal: Abdomen is soft. He exhibits no distension. There is no abdominal tenderness.   Musculoskeletal:         General: No tenderness. Normal range of motion.      Cervical back: Normal range of motion and neck supple.     Neurological: He is alert and oriented to person, place, and time. He has normal strength. No cranial nerve deficit.   Skin: Skin is warm and dry.   Psychiatric: He has a normal mood and affect. His behavior is normal. Judgment and thought content normal.         ED Course   Procedures  Labs Reviewed   CBC W/ AUTO DIFFERENTIAL - Abnormal; Notable for the following components:       Result Value    MPV 9.0 (*)     All other components within normal limits   COMPREHENSIVE METABOLIC PANEL - Abnormal; Notable for the following components:    CO2 21 (*)     Calcium 10.7 (*)     Total Protein 8.8 (*)     Albumin 5.4 (*)     Total Bilirubin 1.4 (*)     Anion Gap 17 (*)     All other components within normal limits   CK - Abnormal; Notable for the following components:     (*)     All other components within normal limits   URINALYSIS, REFLEX TO URINE CULTURE - Abnormal; Notable for the following components:    Specific Gravity, UA >1.030 (*)     Protein, UA 1+ (*)     Ketones, UA Trace (*)     Occult Blood UA 1+ (*)     Urobilinogen, UA 2.0-3.0 (*)     All other components within normal limits    Narrative:     Specimen  Source->Urine   URINALYSIS MICROSCOPIC - Abnormal; Notable for the following components:    RBC, UA 31 (*)     Hyaline Casts, UA 11 (*)     All other components within normal limits    Narrative:     Specimen Source->Urine   BETA - HYDROXYBUTYRATE, SERUM - Abnormal; Notable for the following components:    Beta-Hydroxybutyrate 1.3 (*)     All other components within normal limits   DRUG SCREEN PANEL, URINE EMERGENCY - Abnormal; Notable for the following components:    Creatinine, Urine 405.2 (*)     All other components within normal limits    Narrative:     Specimen Source->Urine   ISTAT PROCEDURE - Abnormal; Notable for the following components:    POC PH 7.299 (*)     POC PCO2 48.9 (*)     POC PO2 21 (*)     POC BE -3 (*)     All other components within normal limits   LIPASE   MAGNESIUM   PHOSPHORUS   LACTIC ACID, PLASMA   ALCOHOL,MEDICAL (ETHANOL)          Imaging Results              X-Ray Chest PA And Lateral (In process)                      Medications   melatonin tablet 6 mg (has no administration in time range)   senna-docusate 8.6-50 mg per tablet 1 tablet (has no administration in time range)   acetaminophen tablet 650 mg (has no administration in time range)   naloxone 0.4 mg/mL injection 0.02 mg (has no administration in time range)   magnesium oxide tablet 800 mg (has no administration in time range)   magnesium oxide tablet 800 mg (has no administration in time range)   glucose chewable tablet 16 g (has no administration in time range)   glucose chewable tablet 24 g (has no administration in time range)   glucagon (human recombinant) injection 1 mg (has no administration in time range)   sodium chloride 0.9% flush 10 mL (has no administration in time range)   lactated ringers infusion (has no administration in time range)   sodium chloride 0.9% bolus 1,000 mL 1,000 mL (0 mLs Intravenous Stopped 5/2/24 1340)   ondansetron injection 8 mg (8 mg Intravenous Given 5/2/24 1239)   sodium chloride 0.9%  bolus 1,000 mL 1,000 mL (1,000 mLs Intravenous New Bag 5/2/24 3626)     Medical Decision Making  HPI and physical exam as above.  Patient with muscle spasms/fasciculations in the bilateral upper and lower extremities and several episodes of nausea and vomiting.  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  States that he was working in the sun for hours in a row for the past week.  Physical exam is normal.  CPK is elevated but is just below 1000.  Labs with high anion gap metabolic acidosis.  Glucose is normal.  Beta hydroxybutyrate also slightly elevated.  Drug screen and ethanol negative.  Lactic acid within normal limits.  Treated with IV fluids.  Patient will be placed in observation for further management by Hospital Medicine.    Amount and/or Complexity of Data Reviewed  Labs: ordered. Decision-making details documented in ED Course.  Radiology: ordered.    Risk  Prescription drug management.  Decision regarding hospitalization.            Scribe Attestation:   Scribe #1: I performed the above scribed service and the documentation accurately describes the services I performed. I attest to the accuracy of the note.                             I, Carmine Blair NP, personally performed the services described in this documentation.  All medical record entries made by the scribe were at my direction and in my presence.  I have reviewed the chart and agree that the record reflects my personal performance and is accurate and complete.    Clinical Impression:  Final diagnoses:  [R11.2] Nausea and vomiting, unspecified vomiting type  [E87.20] Acidosis  [R74.8] Elevated CPK (Primary)  [R07.9] Chest pain  [E87.20] Metabolic acidosis          ED Disposition Condition    Observation Stable                Carmine Blair NP  05/02/24 5507

## 2024-05-02 NOTE — ADMISSIONCARE
AdmissionCare    Guideline: General Observation, Observation    Based on the indications selected for the patient, the bed status of Observation was determined to be MET    The following indications were selected as present at the time of evaluation of the patient:      - Clinical care (eg, testing, monitoring, or treatment) needed beyond the usual emergency department time frame (eg, 3 to 4 hours)   - Clinical care needed is not appropriate for a lower level of care (ie, discharge to outpatient setting not appropriate).   - Patient has clinical condition for which observation care is needed, as indicated by 1 or more of the following:    - Electrolyte or metabolic condition or finding (eg, hypernatremia, hyponatremia, hyperkalemia, hypokalemia, hypercalcemia, metabolic acidosis, malnutrition, Anasarca)    - Musculoskeletal condition or finding (eg, injury, dislocation, fracture, suspected joint or bone infection, trauma, exacerbation of rheumatologic disease, suspected rhabdomyolysis, suspected compartment syndrome, frostbite)    AdmissionCare documentation entered by: Dejah Khan    [facility], 27th edition, Copyright © 2023 [facility], [facility] All Rights Reserved.  [id number]:00

## 2024-05-02 NOTE — ASSESSMENT & PLAN NOTE
Presents with cramps in setting of exertional work. CPK of 928 with CO2 of 21 and anion gap of 17. Beta 1.3. VBG with pH of 7.299 though normal bicarb  Will start on IVF  Repeat CPK and CMP  Check chest x-ray given VBG findings, though patient without respiratory complaints

## 2024-05-02 NOTE — HPI
Nakul Bueno 24 y.o. male presents to the hospital with a chief complaint of chills and cramps.  Reports 4-5 days of intermittent cramping with associated chills.  Yesterday he worked for a.m. to 4:00 p.m. outside in the heat and awoke today finding diffuse cramps causing presentation to the hospital.  He had 3 episodes of vomiting today.  He attempted treatment at home with Gatorade fluids and a banana without improvement.  He reports improvement with treatment in the emergency room.  He takes no daily medications.  He reports he occasionally vapes.  He denies fever chest pain shortness breast leg swelling melena hematuria hematemesis dizziness and syncope.    In the ED, afebrile without leukocytosis anion gap 17 CO2 21  beta hydroxy 1.3 pH 7.299 on VBG.

## 2024-05-02 NOTE — ADMISSIONCARE
AdmissionCare    Guideline: General Observation, Observation    Based on the indications selected for the patient, the bed status of Observation was determined to be NOT MET    The following indications were selected as present at the time of evaluation of the patient:      - Clinical care (eg, testing, monitoring, or treatment) needed beyond the usual emergency department time frame (eg, 3 to 4 hours)   - Clinical care needed is not appropriate for a lower level of care (ie, discharge to outpatient setting not appropriate).    AdmissionCare documentation entered by: Dejah Khan    [facility], 27th edition, Copyright © 2023 [facility], [facility] All Rights Reserved.  [id number]:57-05:00

## 2024-05-03 NOTE — HOSPITAL COURSE
Nakul Bueno 24 y.o. male placed in observation for high anion gap metabolic acidosis.  He presented for myalgias and diffuse cramping in the setting heavy exertion outside.  He began to nausea and vomiting.  In the emergency room he was found to have an anion gap of 17 of bicarb 21 calcium of 10.7 CPK of 928 and a beta hydroxy butyrate of 1.3.  He received multiple L of IV fluids and was tolerating p.o..  He requested discharge.  A repeat CMP and CPK was obtained which showed normal anion gap and CPK that downtrended to 689.  Bicarb with mild decreased to 19 from 21 though suspect related to normal saline as chloride increased to 109.  He reported he felt well and requested discharge.  He was referred to [facility] as he had no previous primary care physician.  He was encouraged To maintain oral hydration.  At discharge patient was asymptomatic.

## 2024-05-03 NOTE — DISCHARGE SUMMARY
[facility] Emergency Dept  Hospital Medicine  Discharge Summary      Patient Name: Nakul Bueno  MRN: [id number]  [facility]: [id number]  Patient Class: OP- Observation  Admission Date: 5/2/2024  Hospital Length of Stay: 0 days  Discharge Date and Time:  05/02/2024 8:49 PM  Attending Physician: Tatyana Mendoza MD   Discharging Provider: Estuardo Burton PA-C  Primary Care Provider: Sharri Primary Doctor    Primary Care Team: Networked reference to record PCT     HPI:   Nakul Bueno 24 y.o. male presents to the hospital with a chief complaint of chills and cramps.  Reports 4-5 days of intermittent cramping with associated chills.  Yesterday he worked for a.m. to 4:00 p.m. outside in the heat and awoke today finding diffuse cramps causing presentation to the hospital.  He had 3 episodes of vomiting today.  He attempted treatment at home with Gatorade fluids and a banana without improvement.  He reports improvement with treatment in the emergency room.  He takes no daily medications.  He reports he occasionally vapes.  He denies fever chest pain shortness breast leg swelling melena hematuria hematemesis dizziness and syncope.    In the ED, afebrile without leukocytosis anion gap 17 CO2 21  beta hydroxy 1.3 pH 7.299 on VBG.    * No surgery found *      Hospital Course:   Nakul Bueno 24 y.o. male placed in observation for high anion gap metabolic acidosis.  He presented for myalgias and diffuse cramping in the setting heavy exertion outside.  He began to nausea and vomiting.  In the emergency room he was found to have an anion gap of 17 of bicarb 21 calcium of 10.7 CPK of 928 and a beta hydroxy butyrate of 1.3.  He received multiple L of IV fluids and was tolerating p.o..  He requested discharge.  A repeat CMP and CPK was obtained which showed normal anion gap and CPK that downtrended to 689.  Bicarb with mild decreased to 19 from 21 though suspect related to normal saline as chloride increased to 109.  He reported he  felt well and requested discharge.  He was referred to [facility] as he had no previous primary care physician.  He was encouraged To maintain oral hydration.  At discharge patient was asymptomatic.     Goals of Care Treatment Preferences:  Code Status: Full Code      Consults:   Consults (From admission, onward)          Status Ordering Provider     Case Management/  Once        Provider:  (Not yet assigned)    FELICIA Bustillos            No new Assessment & Plan notes have been filed under this hospital service since the last note was generated.  Service: Hospital Medicine    Final Active Diagnoses:    Diagnosis Date Noted POA    PRINCIPAL PROBLEM:  High anion gap metabolic acidosis [E87.29] 05/02/2024 Unknown      Problems Resolved During this Admission:       Discharged Condition: stable    Disposition: Home or Self Care    Follow Up:   Follow-up Information       [facility] - .    Contact information:  [address] [place] [address] [phone]                           Patient Instructions:      Diet Adult Regular     Notify your health care provider if you experience any of the following:  temperature >100.4     Notify your health care provider if you experience any of the following:  persistent nausea and vomiting or diarrhea     Notify your health care provider if you experience any of the following:  increased confusion or weakness     Notify your health care provider if you experience any of the following:  persistent dizziness, light-headedness, or visual disturbances     Activity as tolerated       Significant Diagnostic Studies: Labs: CMP   Recent Labs   Lab 05/02/24  1229 05/02/24  1953    141   K 3.7 3.8    109   CO2 21* 19*   GLU 90 87   BUN 17 13   CREATININE 1.2 0.9   CALCIUM 10.7* 9.2   PROT 8.8* 6.8   ALBUMIN 5.4* 4.3   BILITOT 1.4* 1.1*   ALKPHOS 69 55   AST 32 24   ALT 15 12   ANIONGAP 17* 13    and CBC   Recent Labs   Lab  05/02/24  1229   WBC 5.01   HGB 16.1   HCT 47.3          Pending Diagnostic Studies:       None           Medications:  Reconciled Home Medications:      Medication List      You have not been prescribed any medications.         Indwelling Lines/Drains at time of discharge:   Lines/Drains/Airways       None                   Time spent on the discharge of patient: 34 minutes         Estuardo Burton PA-C  Department of Hospital Medicine  [facility] - Emergency Dept

## 2024-07-30 ENCOUNTER — HOSPITAL ENCOUNTER (EMERGENCY)
Facility: HOSPITAL | Age: 24
Discharge: HOME | End: 2024-07-30
Attending: STUDENT IN AN ORGANIZED HEALTH CARE EDUCATION/TRAINING PROGRAM
Payer: COMMERCIAL

## 2024-07-30 VITALS
HEIGHT: 68 IN | SYSTOLIC BLOOD PRESSURE: 129 MMHG | RESPIRATION RATE: 18 BRPM | TEMPERATURE: 98 F | BODY MASS INDEX: 24.55 KG/M2 | HEART RATE: 63 BPM | WEIGHT: 162 LBS | OXYGEN SATURATION: 99 % | DIASTOLIC BLOOD PRESSURE: 64 MMHG

## 2024-07-30 DIAGNOSIS — J02.9 VIRAL PHARYNGITIS: Primary | ICD-10-CM

## 2024-07-30 LAB
CTP QC/QA: YES
CTP QC/QA: YES
MOLECULAR STREP A: NEGATIVE
SARS-COV-2 RDRP RESP QL NAA+PROBE: NEGATIVE

## 2024-07-30 PROCEDURE — 87651 STREP A DNA AMP PROBE: CPT

## 2024-07-30 PROCEDURE — 99282 EMERGENCY DEPT VISIT SF MDM: CPT

## 2024-07-30 PROCEDURE — 87635 SARS-COV-2 COVID-19 AMP PRB: CPT

## 2024-07-30 NOTE — ED PROVIDER NOTES
Encounter Date: 7/30/2024       History     Chief Complaint   Patient presents with    Sore Throat     Pt reports sore throat, chills, and runny nose x3 days. Pt denies fevers. Pt denies taking medications for his symptoms today.     Chief complaint:  Sore throat     History of present illness: Patient is a 24-year-old male who reports 3 days of chills runny nose and sore throat.  Denies fever and all those symptoms at this time.  Took TheraFlu without relief.  Current severity pain is 0/10.    The history is provided by the patient. No  was used.     Review of patient's allergies indicates:   Allergen Reactions    Teller      History reviewed. No pertinent past medical history.  Past Surgical History:   Procedure Laterality Date    ANKLE SURGERY       No family history on file.  Social History     Tobacco Use    Smoking status: Never    Smokeless tobacco: Never   Substance Use Topics    Alcohol use: Not Currently    Drug use: Never     Review of Systems   Constitutional:  Positive for chills.   HENT:  Positive for rhinorrhea and sore throat.        Physical Exam     Initial Vitals [07/30/24 1050]   BP Pulse Resp Temp SpO2   129/64 63 18 98.4 °F (36.9 °C) 99 %      MAP       --         Physical Exam    Nursing note and vitals reviewed.  Constitutional: He appears well-developed and well-nourished. He is not diaphoretic. No distress. He is not intubated.   HENT:   Head: Normocephalic and atraumatic.   Right Ear: Hearing, tympanic membrane, external ear and ear canal normal.   Left Ear: Hearing, tympanic membrane, external ear and ear canal normal.   Nose: Nose normal. No mucosal edema or rhinorrhea. No epistaxis. Right sinus exhibits no maxillary sinus tenderness and no frontal sinus tenderness. Left sinus exhibits no maxillary sinus tenderness and no frontal sinus tenderness.   Mouth/Throat: Uvula is midline, oropharynx is clear and moist and mucous membranes are normal. No oral lesions. Normal  dentition.   Eyes: Pupils are equal, round, and reactive to light. Right eye exhibits no discharge. Left eye exhibits no discharge. No scleral icterus.   Neck:   Normal range of motion.  Pulmonary/Chest: Effort normal and breath sounds normal. No accessory muscle usage. No apnea, no tachypnea and no bradypnea. He is not intubated. No respiratory distress. He has no decreased breath sounds. He has no wheezes. He has no rhonchi. He has no rales.   Abdominal: He exhibits no distension.   Musculoskeletal:         General: Normal range of motion.      Cervical back: Normal range of motion.     Neurological: He is alert and oriented to person, place, and time.   Skin: Skin is dry. Capillary refill takes less than 2 seconds.         ED Course   Procedures  Labs Reviewed   SARS-COV-2 RDRP GENE       Result Value    POC Rapid COVID Negative       Acceptable Yes     POCT STREP A MOLECULAR    Molecular Strep A, POC Negative       Acceptable Yes            Imaging Results    None          Medications - No data to display  Medical Decision Making  This is an evaluation of a 24 y.o. male that presents to the Emergency Department for sore throat, chills, and runny nose. The patient is a non-toxic, afebrile, and well appearing male. On physical exam, the tonsils are symmetrical with no erythema and without exudates. The uvula is midline with no drooling, hoarseness, trismus, facial swelling, meningeal signs; no findings to suggest peritonsillar or retropharyngeal abscess, epiglottitis, or airway compromise. There is no cervical lymphadenopathy. TM's WNL. Breath sounds clear and equal to ascultation bilaterally. Mucus membranes are moist and he appears well hydrated.     Vital Signs Are Reassuring. Rapid Strep Test: Negative.     Given the above findings, my overall impression is viral pharyngitis. I do not suspect OM, OE, peritonsillar abscess, retropharyngeal abscess, epiglotitis, meningitis,  "significant dehydration or airway compromise.     Tylenol/Ibuprofen PRN, sore throat self care DC instructions. The diagnosis, treatment plan, instructions for follow-up and reevaluation with Primary Care as well as ED return precautions have been discussed with the patient and understanding of the information was verbalized. All questions or concerns from the patient have been addressed.     Problems Addressed:  Viral pharyngitis: acute illness or injury     Details: Over-the-counter outlined on AVS.    Amount and/or Complexity of Data Reviewed  Labs:  Decision-making details documented in ED Course.  Discussion of management or test interpretation with external provider(s): Vital signs at the time of disposition were:  /64 (BP Location: Right arm)   Pulse 63   Temp 98.4 °F (36.9 °C) (Oral)   Resp 18   Ht 5' 8" (1.727 m)   Wt 73.5 kg (162 lb)   SpO2 99%   BMI 24.63 kg/m²       See AVS for additional recommendations. Medications listed herein were prescribed after reviewing the patient's allergies, medication list, history, most recent laboratories as available.  Referrals below were provided after reviewing the patient's previous medical providers. He understands he  should return for any worsening or changes in condition.  Prior to discharge the patient was asked if he  had any additional concerns or complaints and he declined. The patient was given an opportunity to ask questions and all were answered to his satisfaction.     Risk  OTC drugs.  Diagnosis or treatment significantly limited by social determinants of health.               ED Course as of 07/30/24 1446   Tue Jul 30, 2024   1055 BP: 129/64 [VC]   1055 Temp: 98.4 °F (36.9 °C) [VC]   1055 Temp Source: Oral [VC]   1055 Pulse: 63 [VC]   1055 Resp: 18 [VC]   1055 SpO2: 99 % [VC]   1120 SARS-CoV-2 RNA, Amplification, Qual: Negative [VC]   1120 Molecular Strep A, POC: Negative [VC]      ED Course User Index  [VC] Ed Friend, DNP        "                    Clinical Impression:  Final diagnoses:  [J02.9] Viral pharyngitis (Primary)          ED Disposition Condition    Discharge Stable          ED Prescriptions    None       Follow-up Information       Follow up With Specialties Details Why Contact Info    [facility] -  Schedule an appointment as soon as possible for a visit   [address] [place] [address]  [phone]               Ed Friend, [facility]  07/30/24 1448

## 2024-07-30 NOTE — Clinical Note
"Nakul "Travis Bueno was seen and treated in our emergency department on 7/30/2024.  He may return to work on 08/04/2024.       If you have any questions or concerns, please don't hesitate to call.      Ed Friend, DNP"

## 2024-07-30 NOTE — DISCHARGE INSTRUCTIONS
Alternate tylenol/ibuprofen every 3h as directed on packages.    Cepacol Lozenges as directed on package.  Warm fluids and warm saltwater gargles.       Flonase as directed on package.     Return to the Emergency Department for any worsening, change in condition, or any emergent concerns.

## 2025-07-20 ENCOUNTER — HOSPITAL ENCOUNTER (EMERGENCY)
Facility: HOSPITAL | Age: 25
Discharge: HOME | End: 2025-07-20
Attending: STUDENT IN AN ORGANIZED HEALTH CARE EDUCATION/TRAINING PROGRAM
Payer: COMMERCIAL

## 2025-07-20 VITALS
TEMPERATURE: 98 F | OXYGEN SATURATION: 97 % | BODY MASS INDEX: 24.55 KG/M2 | RESPIRATION RATE: 20 BRPM | WEIGHT: 162 LBS | SYSTOLIC BLOOD PRESSURE: 117 MMHG | DIASTOLIC BLOOD PRESSURE: 68 MMHG | HEART RATE: 20 BPM | HEIGHT: 68 IN

## 2025-07-20 DIAGNOSIS — R25.2 MUSCLE CRAMPS: Primary | ICD-10-CM

## 2025-07-20 DIAGNOSIS — E86.0 DEHYDRATION: ICD-10-CM

## 2025-07-20 LAB
ALLENS TEST: ABNORMAL
ANION GAP SERPL CALC-SCNC: 19 MMOL/L (ref 8–16)
BUN SERPL-MCNC: 18 MG/DL (ref 6–30)
CHLORIDE SERPL-SCNC: 98 MMOL/L (ref 95–110)
CREAT SERPL-MCNC: 1.4 MG/DL (ref 0.5–1.4)
DELSYS: ABNORMAL
GLUCOSE SERPL-MCNC: 100 MG/DL (ref 70–110)
HCT VFR BLD CALC: 53 %PCV (ref 36–54)
POC IONIZED CALCIUM: 1.21 MMOL/L (ref 1.06–1.42)
POC TCO2 (MEASURED): 24 MMOL/L (ref 23–29)
POCT GLUCOSE: 122 MG/DL (ref 70–110)
POTASSIUM BLD-SCNC: 3.4 MMOL/L (ref 3.5–5.1)
SAMPLE: ABNORMAL
SITE: ABNORMAL
SODIUM BLD-SCNC: 138 MMOL/L (ref 136–145)

## 2025-07-20 PROCEDURE — 82962 GLUCOSE BLOOD TEST: CPT

## 2025-07-20 PROCEDURE — 96375 TX/PRO/DX INJ NEW DRUG ADDON: CPT

## 2025-07-20 PROCEDURE — 63600175 PHARM REV CODE 636 W HCPCS: Performed by: STUDENT IN AN ORGANIZED HEALTH CARE EDUCATION/TRAINING PROGRAM

## 2025-07-20 PROCEDURE — 93010 ELECTROCARDIOGRAM REPORT: CPT | Mod: ,,, | Performed by: INTERNAL MEDICINE

## 2025-07-20 PROCEDURE — 99284 EMERGENCY DEPT VISIT MOD MDM: CPT | Mod: 25

## 2025-07-20 PROCEDURE — 93005 ELECTROCARDIOGRAM TRACING: CPT

## 2025-07-20 PROCEDURE — 96374 THER/PROPH/DIAG INJ IV PUSH: CPT

## 2025-07-20 PROCEDURE — 96361 HYDRATE IV INFUSION ADD-ON: CPT

## 2025-07-20 PROCEDURE — 82565 ASSAY OF CREATININE: CPT

## 2025-07-20 PROCEDURE — 25000003 PHARM REV CODE 250: Performed by: STUDENT IN AN ORGANIZED HEALTH CARE EDUCATION/TRAINING PROGRAM

## 2025-07-20 RX ORDER — ONDANSETRON HYDROCHLORIDE 2 MG/ML
4 INJECTION, SOLUTION INTRAVENOUS
Status: COMPLETED | OUTPATIENT
Start: 2025-07-20 | End: 2025-07-20

## 2025-07-20 RX ORDER — FAMOTIDINE 10 MG/ML
20 INJECTION, SOLUTION INTRAVENOUS
Status: COMPLETED | OUTPATIENT
Start: 2025-07-20 | End: 2025-07-20

## 2025-07-20 RX ADMIN — SODIUM CHLORIDE 1000 ML: 9 INJECTION, SOLUTION INTRAVENOUS at 07:07

## 2025-07-20 RX ADMIN — FAMOTIDINE 20 MG: 10 INJECTION, SOLUTION INTRAVENOUS at 09:07

## 2025-07-20 RX ADMIN — ONDANSETRON 4 MG: 2 INJECTION INTRAMUSCULAR; INTRAVENOUS at 08:07

## 2025-07-21 NOTE — ED PROVIDER NOTES
Encounter Date: 7/20/2025       History     Chief Complaint   Patient presents with    Vomiting     Pt arrived to the ED due to n/v and body cramping this evening after moving furniture and belongings in the heat today since 8am this morning. Reports epigastric pain also      25 year old male presents for muscle cramps, nausea, and non-bloody, non-bilious emesis today after moving furniture and belongings in the heat today since 8am. He has had similar symptoms in the past secondary to heat. He also reports associated epigastric abdominal pain.        Review of patient's allergies indicates:   Allergen Reactions    [place]      History reviewed. No pertinent past medical history.  Past Surgical History:   Procedure Laterality Date    ANKLE SURGERY       No family history on file.  Social History[1]  Review of Systems   Gastrointestinal:  Positive for nausea and vomiting.   Musculoskeletal:  Positive for myalgias.       Physical Exam     Initial Vitals [07/20/25 1937]   BP Pulse Resp Temp SpO2   130/78 69 20 98.4 °F (36.9 °C) 98 %      MAP       --         Physical Exam    Nursing note and vitals reviewed.  Constitutional: He appears well-developed and well-nourished.   HENT:   Head: Normocephalic and atraumatic. Mouth/Throat: Oropharynx is clear and moist.   Eyes: Conjunctivae and EOM are normal. Pupils are equal, round, and reactive to light.   Neck: No thyromegaly present.   Normal range of motion.  Cardiovascular:  Normal rate, regular rhythm and intact distal pulses.           Pulmonary/Chest: Breath sounds normal. No respiratory distress. He has no wheezes.   Abdominal: Abdomen is soft. Bowel sounds are normal. He exhibits no distension. There is no abdominal tenderness.   Musculoskeletal:         General: No tenderness or edema. Normal range of motion.      Cervical back: Normal range of motion.     Neurological: He is alert and oriented to person, place, and time. He has normal strength. No cranial nerve  deficit.   Skin: Skin is warm and dry. No rash noted.   Psychiatric: He has a normal mood and affect. His behavior is normal. Thought content normal.         ED Course   Procedures  Labs Reviewed   POCT GLUCOSE - Abnormal       Result Value    POCT Glucose 122 (*)    ISTAT PROCEDURE - Abnormal    POC Glucose 100      POC BUN 18      POC Creatinine 1.4      POC Sodium 138      POC Potassium 3.4 (*)     POC Chloride 98      POC TCO2 (MEASURED) 24      POC Anion Gap 19 (*)     POC Ionized Calcium 1.21      POC Hematocrit 53      Sample VENOUS      Site Other      Allens Test N/A      DelSys Room Air       EKG Readings: (Independently Interpreted)   EKG with regular rate, regular rhythm, normal axis, no acute ST elevations or depressions, normal LA, QRS and QT interval. Interpreted by me.         Imaging Results    None          Medications   sodium chloride 0.9% bolus 1,000 mL 1,000 mL (0 mLs Intravenous Stopped 7/20/25 2141)   ondansetron injection 4 mg (4 mg Intravenous Given 7/20/25 2050)   famotidine (PF) injection 20 mg (20 mg Intravenous Given 7/20/25 2114)     Medical Decision Making  25-year-old male presents for muscle cramps, nausea and vomiting, ongoing since this morning. Vitals normal. Exam w/o acute abnormality. Patient given 1L IVF and zofran with resolution of his symptoms. Chem8 w/o acute abnormality. UA WNL.  Patient is feeling well, stable for discharge with outpatient follow-up and return precautions for worsening symptoms    Amount and/or Complexity of Data Reviewed  Labs:  Decision-making details documented in ED Course.    Risk  Prescription drug management.               ED Course as of 07/21/25 0051 Mon Jul 21, 2025 0051 POCT glucose(!) [BS]   0051 ISTAT PROCEDURE(!) [BS]      ED Course User Index  [BS] Gunner Johnson MD                               Clinical Impression:  Final diagnoses:  [R25.2] Muscle cramps (Primary)  [E86.0] Dehydration          ED Disposition Condition     Discharge Stable          ED Prescriptions    None       Follow-up Information       Follow up With Specialties Details Why Contact [facility] -  Call in 1 day To set up a follow-up appointment, To recheck today's symptoms [address]  [place] [id number]  [phone]                     [1]   Social History  Tobacco Use    Smoking status: Never    Smokeless tobacco: Never   Vaping Use    Vaping status: Every Day    Substances: Nicotine    Devices: Disposable   Substance Use Topics    Alcohol use: Not Currently    Drug use: Never        Gunner Johnson MD  07/21/25 0051

## 2025-07-21 NOTE — ED TRIAGE NOTES
Nakul Bueno, a 25 y.o. male presents to the ED w/ complaint of dehydration. Pt reports he was moving furniture in the heat we he became over heated. Pt states he was N/V, muscle cramps, and chills. Pt denies CP, SOB, or any other symptoms. Pt is AAOx4. NADN. Family at the bedside.

## 2025-07-26 LAB
OHS QRS DURATION: 88 MS
OHS QRS DURATION: 88 MS
OHS QTC CALCULATION: 385 MS
OHS QTC CALCULATION: 398 MS